# Patient Record
Sex: MALE | Race: BLACK OR AFRICAN AMERICAN | Employment: UNEMPLOYED | URBAN - METROPOLITAN AREA
[De-identification: names, ages, dates, MRNs, and addresses within clinical notes are randomized per-mention and may not be internally consistent; named-entity substitution may affect disease eponyms.]

---

## 2024-01-15 ENCOUNTER — OFFICE VISIT (OUTPATIENT)
Age: 64
End: 2024-01-15
Payer: MEDICAID

## 2024-01-15 VITALS
HEIGHT: 69 IN | HEART RATE: 72 BPM | BODY MASS INDEX: 35.28 KG/M2 | OXYGEN SATURATION: 100 % | SYSTOLIC BLOOD PRESSURE: 148 MMHG | TEMPERATURE: 97.6 F | RESPIRATION RATE: 16 BRPM | WEIGHT: 238.2 LBS | DIASTOLIC BLOOD PRESSURE: 88 MMHG

## 2024-01-15 DIAGNOSIS — M79.89 LEFT LEG SWELLING: ICD-10-CM

## 2024-01-15 DIAGNOSIS — I10 PRIMARY HYPERTENSION: ICD-10-CM

## 2024-01-15 DIAGNOSIS — R06.09 DYSPNEA ON EXERTION: Primary | ICD-10-CM

## 2024-01-15 PROCEDURE — 3079F DIAST BP 80-89 MM HG: CPT

## 2024-01-15 PROCEDURE — 99214 OFFICE O/P EST MOD 30 MIN: CPT

## 2024-01-15 PROCEDURE — 93000 ELECTROCARDIOGRAM COMPLETE: CPT

## 2024-01-15 PROCEDURE — 3077F SYST BP >= 140 MM HG: CPT

## 2024-01-15 RX ORDER — ALLOPURINOL 100 MG/1
100 TABLET ORAL DAILY
COMMUNITY
Start: 2024-01-10

## 2024-01-15 RX ORDER — VALSARTAN 320 MG/1
320 TABLET ORAL DAILY
Qty: 90 TABLET | Refills: 2 | Status: SHIPPED | OUTPATIENT
Start: 2024-01-15 | End: 2024-01-15 | Stop reason: CLARIF

## 2024-01-15 RX ORDER — SOTALOL HYDROCHLORIDE 80 MG/1
160 TABLET ORAL 2 TIMES DAILY
Qty: 60 TABLET | Refills: 1 | Status: SHIPPED | OUTPATIENT
Start: 2024-01-15

## 2024-01-15 RX ORDER — VALSARTAN 160 MG/1
160 TABLET ORAL DAILY
Qty: 30 TABLET | Refills: 0
Start: 2024-01-15 | End: 2024-01-19 | Stop reason: DRUGHIGH

## 2024-01-15 ASSESSMENT — ENCOUNTER SYMPTOMS
ABDOMINAL PAIN: 0
WHEEZING: 0
SORE THROAT: 0
DIARRHEA: 0
NAUSEA: 0
SHORTNESS OF BREATH: 1
COUGH: 0
VOMITING: 0
RHINORRHEA: 0

## 2024-01-15 NOTE — PROGRESS NOTES
daily (with meals)       No current facility-administered medications for this visit.        Social History     Tobacco Use    Smoking status: Former     Types: Cigarettes    Smokeless tobacco: Never   Vaping Use    Vaping Use: Never used   Substance Use Topics    Alcohol use: Never    Drug use: Never        Family History   Problem Relation Age of Onset    Hypertension Maternal Grandmother         Review of Systems   Constitutional:  Negative for activity change, chills, diaphoresis, fatigue and fever.   HENT:  Negative for congestion, rhinorrhea and sore throat.    Eyes:  Negative for visual disturbance.   Respiratory:  Positive for shortness of breath. Negative for cough and wheezing.    Cardiovascular:  Positive for leg swelling (left leg only). Negative for chest pain.   Gastrointestinal:  Negative for abdominal pain, diarrhea, nausea and vomiting.   Genitourinary:  Negative for difficulty urinating and flank pain.   Musculoskeletal:  Negative for myalgias.   Skin:  Negative for rash and wound.   Neurological:  Negative for dizziness, syncope, weakness, light-headedness and headaches.   Psychiatric/Behavioral:  Negative for agitation and confusion. The patient is not nervous/anxious.        Physical Exam  Constitutional:       Comments: Appears to be sitting upright comfortably. No apparent distress.    HENT:      Head: Normocephalic and atraumatic.      Mouth/Throat:      Mouth: Mucous membranes are moist.   Eyes:      Extraocular Movements: Extraocular movements intact.   Cardiovascular:      Rate and Rhythm: Normal rate and regular rhythm.      Pulses: Normal pulses.      Heart sounds: Normal heart sounds. No murmur heard.     No friction rub. No gallop.   Pulmonary:      Effort: Pulmonary effort is normal. No respiratory distress.      Breath sounds: Normal breath sounds. No stridor. No rhonchi or rales.   Abdominal:      General: Bowel sounds are normal. There is no distension.      Palpations: Abdomen is

## 2024-01-15 NOTE — PATIENT INSTRUCTIONS
Medication Changes:  Start taking sotalol 160mg twice daily for atrial flutter     Other recommendations:  Get blood pressure cuff   Low sodium diet  Weight loss     Patient Education        DASH Diet: Care Instructions  Your Care Instructions     The DASH diet is an eating plan that can help lower your blood pressure. DASH stands for Dietary Approaches to Stop Hypertension. Hypertension is high blood pressure.  The DASH diet focuses on eating foods that are high in calcium, potassium, and magnesium. These nutrients can lower blood pressure. The foods that are highest in these nutrients are fruits, vegetables, low-fat dairy products, nuts, seeds, and legumes. But taking calcium, potassium, and magnesium supplements instead of eating foods that are high in those nutrients does not have the same effect. The DASH diet also includes whole grains, fish, and poultry.  The DASH diet is one of several lifestyle changes your doctor may recommend to lower your high blood pressure. Your doctor may also want you to decrease the amount of sodium in your diet. Lowering sodium while following the DASH diet can lower blood pressure even further than just the DASH diet alone.  Follow-up care is a key part of your treatment and safety. Be sure to make and go to all appointments, and call your doctor if you are having problems. It's also a good idea to know your test results and keep a list of the medicines you take.  How can you care for yourself at home?  Following the DASH diet  Eat 4 to 5 servings of fruit each day. A serving is 1 medium-sized piece of fruit, 1/2 cup raw or canned fruit, 1/4 cup dried fruit, or 4 ounces (1/2 cup) of fruit juice. Choose fruit more often than fruit juice.  Eat 4 to 5 servings of vegetables each day. A serving is 1 cup of lettuce or raw leafy vegetables, 1/2 cup of chopped or cooked vegetables, or 4 ounces (1/2 cup) of vegetable juice. Choose vegetables more often than vegetable juice.  Get 2 to 3

## 2024-01-19 ENCOUNTER — OFFICE VISIT (OUTPATIENT)
Age: 64
End: 2024-01-19

## 2024-01-19 VITALS
TEMPERATURE: 97.7 F | WEIGHT: 236.6 LBS | HEART RATE: 64 BPM | HEIGHT: 69 IN | RESPIRATION RATE: 16 BRPM | SYSTOLIC BLOOD PRESSURE: 156 MMHG | DIASTOLIC BLOOD PRESSURE: 89 MMHG | OXYGEN SATURATION: 100 % | BODY MASS INDEX: 35.04 KG/M2

## 2024-01-19 DIAGNOSIS — R06.02 SHORTNESS OF BREATH: ICD-10-CM

## 2024-01-19 DIAGNOSIS — I48.92 ATRIAL FLUTTER, UNSPECIFIED TYPE (HCC): Primary | ICD-10-CM

## 2024-01-19 DIAGNOSIS — I11.9 HYPERTENSIVE HEART DISEASE WITHOUT CHF: ICD-10-CM

## 2024-01-19 DIAGNOSIS — I82.512 CHRONIC DEEP VEIN THROMBOSIS (DVT) OF FEMORAL VEIN OF LEFT LOWER EXTREMITY (HCC): ICD-10-CM

## 2024-01-19 PROBLEM — E66.811 OBESITY (BMI 30.0-34.9): Status: ACTIVE | Noted: 2024-01-19

## 2024-01-19 PROBLEM — R73.03 PRE-DIABETES: Status: ACTIVE | Noted: 2024-01-19

## 2024-01-19 PROBLEM — I82.502 CHRONIC DEEP VEIN THROMBOSIS (DVT) OF LEFT LOWER EXTREMITY (HCC): Status: ACTIVE | Noted: 2024-01-19

## 2024-01-19 PROBLEM — E66.9 OBESITY (BMI 30.0-34.9): Status: ACTIVE | Noted: 2024-01-19

## 2024-01-19 PROBLEM — G47.33 OBSTRUCTIVE SLEEP APNEA SYNDROME: Status: ACTIVE | Noted: 2019-01-03

## 2024-01-19 RX ORDER — VALSARTAN 320 MG/1
320 TABLET ORAL DAILY
Qty: 90 TABLET | Refills: 2 | Status: SHIPPED | OUTPATIENT
Start: 2024-01-19

## 2024-01-19 ASSESSMENT — ENCOUNTER SYMPTOMS
DIARRHEA: 0
RHINORRHEA: 0
SHORTNESS OF BREATH: 1
VOMITING: 0
SORE THROAT: 0
NAUSEA: 0
COUGH: 0
ABDOMINAL PAIN: 0
CONSTIPATION: 0
WHEEZING: 0

## 2024-01-19 NOTE — PROGRESS NOTES
Monty Jean (:  1960) is a 63 y.o. male, with history significant for  sleep apnea (noncompliant with CPAP), hypertension, cardiomegaly, systolic murmur, hyperlipidemia, paroxysmal A-fib on sotalol and Eliquis  who presents for follow up of aflutter.    Most recent echo 2021 with normal left ventricular function, EF 63%, normal LV filling, mild concentric hypertrophy, normal RV size and function, normal PASP.     He was last seen on 1/15/2024 for shortness of breath with exertion.  EKG was completed and it was noted that he was then atrial flutter at that time.  His sotalol was increased to 160 mg twice daily and he was asked to return today for a repeat EKG.    Monty states that his shortness of breath has improved slightly over the past few days.  However, he reports that he continues to have shortness of breath with minimal exertion.  He can only go up a few more stairs than he could prior.  This only started about a month ago.  He reports that he did not have any shortness of breath at all prior to this.  He denies chest pain, palpitations, abdominal pain, nausea, vomiting, fevers/chills, dizziness or lightheadedness, PND, orthopnea.  He does have occasional lower extremity edema typically on the left.  No known history of coronary artery disease.    Subjective   SUBJECTIVE/OBJECTIVE:  HPI  See above    Past Medical History:   Diagnosis Date    Diabetes mellitus (HCC)     High cholesterol     Hypertension     Sleep apnea         Past Surgical History:   Procedure Laterality Date    COLONOSCOPY N/A 3/17/2023    COLORECTAL CANCER SCREENING, HIGH RISK performed by Kareem Clifford MD at [facility] ENDOSCOPY        No Known Allergies     Current Outpatient Medications   Medication Sig Dispense Refill    valsartan (DIOVAN) 320 MG tablet Take 1 tablet by mouth daily 90 tablet 2    allopurinol (ZYLOPRIM) 100 MG tablet Take 1 tablet by mouth daily      sotalol (BETAPACE) 80 MG tablet Take 2 tablets by mouth 2

## 2024-01-19 NOTE — PATIENT INSTRUCTIONS
chopped walnuts or almonds to cooked vegetables.  Try some vegetarian meals using beans and peas. Add garbanzo or kidney beans to salads. Make burritos and tacos with mashed duran beans or black beans.  Where can you learn more?  Go to https://www.[employer].net/patientEd and enter H967 to learn more about \"DASH Diet: Care Instructions.\"  Current as of: September 20, 2023               Content Version: 13.9  © 7668-1331 [employer].   Care instructions adapted under license by [employer]. If you have questions about a medical condition or this instruction, always ask your healthcare professional. [employer] disclaims any warranty or liability for your use of this information.

## 2024-02-07 ENCOUNTER — OFFICE VISIT (OUTPATIENT)
Age: 64
End: 2024-02-07
Payer: MEDICAID

## 2024-02-07 VITALS
TEMPERATURE: 97.7 F | BODY MASS INDEX: 33.21 KG/M2 | WEIGHT: 232 LBS | DIASTOLIC BLOOD PRESSURE: 84 MMHG | HEART RATE: 76 BPM | HEIGHT: 70 IN | OXYGEN SATURATION: 96 % | RESPIRATION RATE: 16 BRPM | SYSTOLIC BLOOD PRESSURE: 148 MMHG

## 2024-02-07 DIAGNOSIS — I50.32 CHRONIC DIASTOLIC (CONGESTIVE) HEART FAILURE (HCC): ICD-10-CM

## 2024-02-07 DIAGNOSIS — I10 PRIMARY HYPERTENSION: ICD-10-CM

## 2024-02-07 DIAGNOSIS — G47.33 OBSTRUCTIVE SLEEP APNEA: ICD-10-CM

## 2024-02-07 DIAGNOSIS — E78.00 HYPERCHOLESTEREMIA: ICD-10-CM

## 2024-02-07 DIAGNOSIS — I48.19 ATRIAL FIBRILLATION, PERSISTENT (HCC): ICD-10-CM

## 2024-02-07 DIAGNOSIS — R06.02 EXERTIONAL SHORTNESS OF BREATH: Primary | ICD-10-CM

## 2024-02-07 DIAGNOSIS — R01.1 SYSTOLIC MURMUR: ICD-10-CM

## 2024-02-07 PROCEDURE — 3079F DIAST BP 80-89 MM HG: CPT | Performed by: INTERNAL MEDICINE

## 2024-02-07 PROCEDURE — 3077F SYST BP >= 140 MM HG: CPT | Performed by: INTERNAL MEDICINE

## 2024-02-07 PROCEDURE — 99215 OFFICE O/P EST HI 40 MIN: CPT | Performed by: INTERNAL MEDICINE

## 2024-02-07 RX ORDER — AMIODARONE HYDROCHLORIDE 200 MG/1
400 TABLET ORAL 2 TIMES DAILY
Qty: 120 TABLET | Refills: 0 | Status: SHIPPED | OUTPATIENT
Start: 2024-02-07

## 2024-02-07 RX ORDER — METOPROLOL SUCCINATE 50 MG/1
50 TABLET, EXTENDED RELEASE ORAL DAILY
Qty: 30 TABLET | Refills: 5 | Status: SHIPPED | OUTPATIENT
Start: 2024-02-07

## 2024-02-07 RX ORDER — INDOMETHACIN 50 MG/1
CAPSULE ORAL
COMMUNITY
Start: 2024-01-19

## 2024-02-07 RX ORDER — FUROSEMIDE 20 MG/1
20 TABLET ORAL DAILY
Qty: 30 TABLET | Refills: 5 | Status: SHIPPED | OUTPATIENT
Start: 2024-02-07

## 2024-02-07 RX ORDER — SOTALOL HYDROCHLORIDE 120 MG/1
120 TABLET ORAL 2 TIMES DAILY
COMMUNITY
Start: 2024-01-29 | End: 2024-02-07 | Stop reason: ALTCHOICE

## 2024-02-07 ASSESSMENT — LIFESTYLE VARIABLES: HOW MANY STANDARD DRINKS CONTAINING ALCOHOL DO YOU HAVE ON A TYPICAL DAY: PATIENT DOES NOT DRINK

## 2024-02-07 ASSESSMENT — ENCOUNTER SYMPTOMS
ALLERGIC/IMMUNOLOGIC NEGATIVE: 1
EYES NEGATIVE: 1
SHORTNESS OF BREATH: 1
GASTROINTESTINAL NEGATIVE: 1

## 2024-02-07 NOTE — PROGRESS NOTES
1. \"Have you been to the ER, urgent care clinic since your last visit?  Hospitalized since your last visit?\"  Reviewed by Dr. Jeevan Staley    2. \"Have you seen or consulted any other health care providers outside of the [facility] since your last visit?\"   Reviewed by Dr. Jeevan Staley     
Take 1 tablet by mouth 2 times daily      valsartan (DIOVAN) 320 MG tablet Take 1 tablet by mouth daily 90 tablet 2    allopurinol (ZYLOPRIM) 100 MG tablet Take 1 tablet by mouth daily      simvastatin (ZOCOR) 40 MG tablet       apixaban (ELIQUIS) 5 MG TABS tablet Take 1 tablet by mouth 2 times daily      metFORMIN (GLUCOPHAGE) 500 MG tablet Take 1 tablet by mouth 2 times daily (with meals)       No current facility-administered medications for this visit.        Social History     Tobacco Use    Smoking status: Former     Current packs/day: 0.00     Types: Cigarettes     Quit date: 1989     Years since quittin.2    Smokeless tobacco: Never   Vaping Use    Vaping Use: Never used   Substance Use Topics    Alcohol use: Never    Drug use: Never        Family History   Problem Relation Age of Onset    Hypertension Maternal Grandmother         Review of Systems   Constitutional: Negative.    HENT: Negative.     Eyes: Negative.    Respiratory:  Positive for shortness of breath.    Cardiovascular:  Positive for palpitations. Negative for chest pain.   Gastrointestinal: Negative.    Endocrine: Negative.    Genitourinary: Negative.    Allergic/Immunologic: Negative.    Neurological: Negative.    Hematological: Negative.    Psychiatric/Behavioral: Negative.       Physical Exam  Vitals and nursing note reviewed.   Constitutional:       Appearance: Normal appearance.   HENT:      Head: Normocephalic and atraumatic.      Right Ear: External ear normal.      Left Ear: External ear normal.      Nose: Nose normal.      Mouth/Throat:      Mouth: Mucous membranes are dry.      Pharynx: Oropharynx is clear.   Eyes:      Extraocular Movements: Extraocular movements intact.      Conjunctiva/sclera: Conjunctivae normal.      Pupils: Pupils are equal, round, and reactive to light.   Neck:      Thyroid: No thyroid mass.      Vascular: JVD present. No carotid bruit.      Trachea: Trachea normal.   Cardiovascular:      Rate and

## 2024-02-22 DIAGNOSIS — I48.91 ATRIAL FIBRILLATION, UNSPECIFIED TYPE (HCC): Primary | ICD-10-CM

## 2024-03-01 LAB
ANION GAP SERPL CALCULATED.3IONS-SCNC: 11 MMOL/L (ref 3–15)
BASOPHILS # BLD: 1 % (ref 0–2)
BASOPHILS ABSOLUTE: 0.1 K/UL (ref 0–0.2)
BUN BLDV-MCNC: 15 MG/DL (ref 6–22)
CALCIUM SERPL-MCNC: 9.3 MG/DL (ref 8.4–10.5)
CHLORIDE BLD-SCNC: 108 MMOL/L (ref 98–110)
CO2: 27 MMOL/L (ref 20–32)
CREAT SERPL-MCNC: 1.1 MG/DL (ref 0.8–1.6)
EOSINOPHIL # BLD: 3 % (ref 0–6)
EOSINOPHILS ABSOLUTE: 0.2 K/UL (ref 0–0.5)
GLOMERULAR FILTRATION RATE: >60 ML/MIN/1.73 SQ.M.
GLUCOSE: 182 MG/DL (ref 70–99)
HCT VFR BLD CALC: 44.1 % (ref 39.3–51.6)
HEMOGLOBIN: 13.4 G/DL (ref 13.1–17.2)
INR BLD: 1.08 (ref 0.89–1.29)
LYMPHOCYTES # BLD: 25 % (ref 20–45)
LYMPHOCYTES ABSOLUTE: 1.6 K/UL (ref 1–4.8)
MCH RBC QN AUTO: 25 PG (ref 26–34)
MCHC RBC AUTO-ENTMCNC: 30 G/DL (ref 31–36)
MCV RBC AUTO: 83 FL (ref 80–95)
MONOCYTES ABSOLUTE: 0.4 K/UL (ref 0.1–1)
MONOCYTES: 7 % (ref 3–12)
NEUTROPHILS ABSOLUTE: 4.1 K/UL (ref 1.8–7.7)
NEUTROPHILS: 65 % (ref 40–75)
PDW BLD-RTO: 17.2 % (ref 10–15.5)
PLATELET # BLD: 291 K/UL (ref 140–440)
PMV BLD AUTO: 10 FL (ref 9–13)
POTASSIUM SERPL-SCNC: 3.7 MMOL/L (ref 3.5–5.5)
PROTHROMBIN TIME: 11.9 SEC (ref 9–13)
RBC: 5.29 M/UL (ref 3.8–5.8)
SODIUM BLD-SCNC: 146 MMOL/L (ref 133–145)
WBC: 6.3 K/UL (ref 4–11)

## 2024-03-07 ENCOUNTER — TELEPHONE (OUTPATIENT)
Age: 64
End: 2024-03-07

## 2024-03-07 RX ORDER — AMIODARONE HYDROCHLORIDE 200 MG/1
200 TABLET ORAL DAILY
Qty: 90 TABLET | Refills: 3 | Status: SHIPPED | OUTPATIENT
Start: 2024-03-07

## 2024-03-07 NOTE — TELEPHONE ENCOUNTER
Monty Jean is calling he was scheduled for a cardioversion yesterday and his heart went back into rhythm.  He continues to take the meds, however he is out of amiodarone.   He wants a refill.  He said it should go to the [facility] in [facility].  Also ask Dr. Staley if patient still needs the hospital follow up with Mary.        Preferred call back number ; [phone]

## 2024-03-20 ENCOUNTER — OFFICE VISIT (OUTPATIENT)
Age: 64
End: 2024-03-20
Payer: MEDICAID

## 2024-03-20 VITALS
RESPIRATION RATE: 16 BRPM | WEIGHT: 230 LBS | TEMPERATURE: 97.7 F | HEART RATE: 49 BPM | BODY MASS INDEX: 32.93 KG/M2 | SYSTOLIC BLOOD PRESSURE: 161 MMHG | HEIGHT: 70 IN | DIASTOLIC BLOOD PRESSURE: 83 MMHG | OXYGEN SATURATION: 98 %

## 2024-03-20 DIAGNOSIS — I50.33 ACUTE ON CHRONIC DIASTOLIC CHF (CONGESTIVE HEART FAILURE) (HCC): ICD-10-CM

## 2024-03-20 DIAGNOSIS — I48.0 PAROXYSMAL A-FIB (HCC): Primary | ICD-10-CM

## 2024-03-20 DIAGNOSIS — I11.0 HYPERTENSIVE HEART DISEASE WITH CHRONIC DIASTOLIC CONGESTIVE HEART FAILURE (HCC): ICD-10-CM

## 2024-03-20 DIAGNOSIS — I50.32 HYPERTENSIVE HEART DISEASE WITH CHRONIC DIASTOLIC CONGESTIVE HEART FAILURE (HCC): ICD-10-CM

## 2024-03-20 PROCEDURE — 99214 OFFICE O/P EST MOD 30 MIN: CPT

## 2024-03-20 PROCEDURE — 93000 ELECTROCARDIOGRAM COMPLETE: CPT

## 2024-03-20 RX ORDER — METOPROLOL SUCCINATE 25 MG/1
25 TABLET, EXTENDED RELEASE ORAL DAILY
Qty: 90 TABLET | Refills: 3 | Status: SHIPPED | OUTPATIENT
Start: 2024-03-20

## 2024-03-20 ASSESSMENT — ENCOUNTER SYMPTOMS
RHINORRHEA: 0
WHEEZING: 0
SORE THROAT: 0
SHORTNESS OF BREATH: 0
VOMITING: 0
NAUSEA: 0
COUGH: 0
ABDOMINAL PAIN: 0
DIARRHEA: 0

## 2024-03-20 NOTE — PATIENT INSTRUCTIONS
Medication Changes:  Start taking lasix 40mg daily (two 20 mg tablets) for 5 days then return to 20mg daily    Please take your blood pressure twice daily and write them down. Please wait at least 30mins after your medications. Put them in Mychart or call the office with them after doing so for 5 days    Other recommendations:  -Low sodium diet - 2g per day  -Fluid restriction - 1.5L per day    Patient Education        DASH Diet: Care Instructions  Your Care Instructions     The DASH diet is an eating plan that can help lower your blood pressure. DASH stands for Dietary Approaches to Stop Hypertension. Hypertension is high blood pressure.  The DASH diet focuses on eating foods that are high in calcium, potassium, and magnesium. These nutrients can lower blood pressure. The foods that are highest in these nutrients are fruits, vegetables, low-fat dairy products, nuts, seeds, and legumes. But taking calcium, potassium, and magnesium supplements instead of eating foods that are high in those nutrients does not have the same effect. The DASH diet also includes whole grains, fish, and poultry.  The DASH diet is one of several lifestyle changes your doctor may recommend to lower your high blood pressure. Your doctor may also want you to decrease the amount of sodium in your diet. Lowering sodium while following the DASH diet can lower blood pressure even further than just the DASH diet alone.  Follow-up care is a key part of your treatment and safety. Be sure to make and go to all appointments, and call your doctor if you are having problems. It's also a good idea to know your test results and keep a list of the medicines you take.  How can you care for yourself at home?  Following the DASH diet  Eat 4 to 5 servings of fruit each day. A serving is 1 medium-sized piece of fruit, 1/2 cup raw or canned fruit, 1/4 cup dried fruit, or 4 ounces (1/2 cup) of fruit juice. Choose fruit more often than fruit juice.  Eat 4 to 5

## 2024-03-20 NOTE — PROGRESS NOTES
Monty Jean (:  1960) is a 63 y.o. male, with history significant for sleep apnea (noncompliant with CPAP), hypertension, cardiomegaly, systolic murmur, hyperlipidemia, paroxysmal A-fib on eliquis who presents for 2 weeks post cardioversion.    He was last seen in the office on 2024.  It was noted that he was back in A-fib at that time.  His sotalol was stopped and he was started on amiodarone and metoprolol.  He was also started on Lasix given evidence of diastolic dysfunction on recent echo.  Cardioversion was recommended at that time.    He presented for cardioversion on 3/6/2024.  An EKG was completed and it was noted that his rhythm was sinus bradycardia.  Cardioversion was was then canceled.  Amiodarone dosing was changed to 200 mg daily.    Monty states he has been feeling well.  He no longer has any exertional shortness of breath but he does have some orthopnea.  Has occasional lower extremity edema as well.  Denies chest pain, palpitations, abdominal pain, nausea, vomiting, fevers/chills, PND, dizziness or lightheadedness, presyncope or syncope.  He reports he has been eating a lot of fried food and had Popeyes before his appointment today.    Relevant results  Echo 2024    Image quality is fair. Contrast used: Definity and bubble study to rule out shunt.    Multiple abnormal color and doppler flows noted on the right side near the mid and apical IVS, can not rule out multiple muscular VSDs, coronary flow or other abnormalities. Bubble study was negative, however the color flow is Left-Right.    Mitral Valve: Mildly thickened leaflets.    Right Atrium: Right atrium is severely dilated. RA volume index is 64 mL/m2.    Normal left ventricular systolic function. EF by 2D Simpsons Biplane is 57%. Mildly increased wall thickness. Normal wall motion. Indeterminate diastolic function.    Reduced systolic function. TAPSE is 1.5 cm.    Right ventricle is dilated with reduced systolic function.

## 2024-04-08 ENCOUNTER — TELEPHONE (OUTPATIENT)
Age: 64
End: 2024-04-08

## 2024-04-08 NOTE — TELEPHONE ENCOUNTER
Called pt to get bp readings, pt will be getting bp cuff today. Advised pt to start checking twice a day, 30 mins after taking bp meds and I will f/u on Monday 3/15/2024.     TANISHA Riojas

## 2024-04-19 RX ORDER — NIFEDIPINE 30 MG/1
30 TABLET, EXTENDED RELEASE ORAL DAILY
Qty: 90 TABLET | Refills: 3 | Status: SHIPPED | OUTPATIENT
Start: 2024-04-19

## 2024-04-19 NOTE — TELEPHONE ENCOUNTER
Called to discuss blood pressure readings    Monty states he is taking metoprolol XL and valsartan 320mg daily. He is not 100% sure if he is taking 25mg of metoprolol or 50mg. I had decreased his dose to 25mg daily on his last appointment.     Given his poorly controlled blood pressures, I sent a prescription to his pharmacy for nifedipine 30mg daily. I discussed with him that he should take that in addition to his other medications. I also recommended low sodium diet.     He has an appointment with Dr. Staley next week. I asked him to continue to check his blood pressure once he gets the new prescription and bring those readings to his next appointment for review. I clarified with him that he is supposed to be taking the lower dose of metoprolol given his bradycardia.     Mary Hill PA-C  3:39 PM

## 2024-04-19 NOTE — TELEPHONE ENCOUNTER
Spoke with pt wife, pt bp reading for yesterday was 174/107 and 181/111. Took bp meds at 9 AM and checked bp at 12 PM.

## 2024-04-26 ENCOUNTER — OFFICE VISIT (OUTPATIENT)
Age: 64
End: 2024-04-26
Payer: MEDICAID

## 2024-04-26 VITALS
DIASTOLIC BLOOD PRESSURE: 86 MMHG | RESPIRATION RATE: 16 BRPM | TEMPERATURE: 97.7 F | BODY MASS INDEX: 32.64 KG/M2 | WEIGHT: 228 LBS | HEIGHT: 70 IN | SYSTOLIC BLOOD PRESSURE: 139 MMHG | HEART RATE: 86 BPM | OXYGEN SATURATION: 98 %

## 2024-04-26 DIAGNOSIS — E78.00 HYPERCHOLESTEREMIA: ICD-10-CM

## 2024-04-26 DIAGNOSIS — R01.1 SYSTOLIC MURMUR: ICD-10-CM

## 2024-04-26 DIAGNOSIS — I10 PRIMARY HYPERTENSION: ICD-10-CM

## 2024-04-26 DIAGNOSIS — G47.33 OBSTRUCTIVE SLEEP APNEA: ICD-10-CM

## 2024-04-26 DIAGNOSIS — Z79.01 LONG TERM (CURRENT) USE OF ANTICOAGULANTS: ICD-10-CM

## 2024-04-26 DIAGNOSIS — I48.0 PAROXYSMAL ATRIAL FIBRILLATION (HCC): ICD-10-CM

## 2024-04-26 DIAGNOSIS — R06.02 EXERTIONAL SHORTNESS OF BREATH: Primary | ICD-10-CM

## 2024-04-26 DIAGNOSIS — I50.32 CHRONIC DIASTOLIC (CONGESTIVE) HEART FAILURE (HCC): ICD-10-CM

## 2024-04-26 PROCEDURE — 3079F DIAST BP 80-89 MM HG: CPT | Performed by: INTERNAL MEDICINE

## 2024-04-26 PROCEDURE — 3075F SYST BP GE 130 - 139MM HG: CPT | Performed by: INTERNAL MEDICINE

## 2024-04-26 PROCEDURE — 93000 ELECTROCARDIOGRAM COMPLETE: CPT | Performed by: INTERNAL MEDICINE

## 2024-04-26 PROCEDURE — 99214 OFFICE O/P EST MOD 30 MIN: CPT | Performed by: INTERNAL MEDICINE

## 2024-04-26 RX ORDER — SOTALOL HYDROCHLORIDE 80 MG/1
160 TABLET ORAL EVERY 12 HOURS
COMMUNITY
End: 2024-04-26 | Stop reason: ALTCHOICE

## 2024-04-26 RX ORDER — NIFEDIPINE 60 MG/1
60 TABLET, EXTENDED RELEASE ORAL DAILY
Qty: 30 TABLET | Refills: 11 | Status: SHIPPED | OUTPATIENT
Start: 2024-04-26

## 2024-04-26 RX ORDER — AMIODARONE HYDROCHLORIDE 200 MG/1
400 TABLET ORAL 2 TIMES DAILY
Qty: 56 TABLET | Refills: 0 | Status: SHIPPED | OUTPATIENT
Start: 2024-04-26 | End: 2024-05-10

## 2024-04-26 ASSESSMENT — ENCOUNTER SYMPTOMS
ALLERGIC/IMMUNOLOGIC NEGATIVE: 1
GASTROINTESTINAL NEGATIVE: 1
EYES NEGATIVE: 1
SHORTNESS OF BREATH: 1

## 2024-04-26 NOTE — PROGRESS NOTES
1. \"Have you been to the ER, urgent care clinic since your last visit?  Hospitalized since your last visit?\" Reviewed by Dr. Jeevan Staley    2. \"Have you seen or consulted any other health care providers outside of the [facility] since your last visit?\" Reviewed by Dr. Jeevan Staley   
atraumatic.      Right Ear: External ear normal.      Left Ear: External ear normal.      Nose: Nose normal.      Mouth/Throat:      Mouth: Mucous membranes are dry.      Pharynx: Oropharynx is clear.   Eyes:      Extraocular Movements: Extraocular movements intact.      Conjunctiva/sclera: Conjunctivae normal.      Pupils: Pupils are equal, round, and reactive to light.   Neck:      Thyroid: No thyroid mass.      Vascular: No carotid bruit or JVD.      Trachea: Trachea normal.   Cardiovascular:      Rate and Rhythm: Tachycardia present. Rhythm irregularly irregular.      Pulses:           Carotid pulses are 1+ on the right side and 1+ on the left side.       Radial pulses are 1+ on the right side and 1+ on the left side.        Femoral pulses are 1+ on the right side and 1+ on the left side.       Popliteal pulses are 1+ on the right side and 1+ on the left side.        Dorsalis pedis pulses are 1+ on the right side and 1+ on the left side.        Posterior tibial pulses are 1+ on the right side and 1+ on the left side.      Heart sounds: S1 normal and S2 normal. Murmur heard.      Crescendo decrescendo systolic murmur is present with a grade of 2/6.      No gallop. No S4 sounds.   Pulmonary:      Effort: Pulmonary effort is normal.      Breath sounds: Normal breath sounds.   Abdominal:      General: Abdomen is flat. Bowel sounds are normal.      Palpations: Abdomen is soft.   Musculoskeletal:         General: Normal range of motion.      Cervical back: Normal range of motion and neck supple.      Right lower leg: No edema.      Left lower leg: No edema.   Lymphadenopathy:      Cervical: No cervical adenopathy.   Skin:     General: Skin is warm and dry.      Capillary Refill: Capillary refill takes 2 to 3 seconds.   Neurological:      General: No focal deficit present.      Mental Status: He is alert and oriented to person, place, and time.   Psychiatric:         Mood and Affect: Mood normal.

## 2024-05-01 ENCOUNTER — TELEPHONE (OUTPATIENT)
Age: 64
End: 2024-05-01

## 2024-05-01 NOTE — TELEPHONE ENCOUNTER
Patient called and stated that he got a prescription for gout from [facility] and the pharmacy will not fill it  with out the ok from Dr. Staley.     Called the pharmacy and they stated that they were waiting for a call from the prescriber of the medication to call them. They got there clarification and they have filled the patient's prescription.

## 2024-05-24 RX ORDER — AMIODARONE HYDROCHLORIDE 200 MG/1
400 TABLET ORAL DAILY
Qty: 180 TABLET | Refills: 3 | Status: SHIPPED | OUTPATIENT
Start: 2024-05-24

## 2024-05-28 ENCOUNTER — NURSE ONLY (OUTPATIENT)
Age: 64
End: 2024-05-28
Payer: MEDICAID

## 2024-05-28 DIAGNOSIS — I48.0 PAROXYSMAL A-FIB (HCC): Primary | ICD-10-CM

## 2024-05-28 PROCEDURE — 93000 ELECTROCARDIOGRAM COMPLETE: CPT | Performed by: INTERNAL MEDICINE

## 2024-05-28 RX ORDER — COLCHICINE 0.6 MG/1
0.6 TABLET ORAL DAILY
COMMUNITY
Start: 2024-05-23

## 2024-05-28 RX ORDER — ALLOPURINOL 300 MG/1
300 TABLET ORAL DAILY
COMMUNITY
Start: 2024-04-29

## 2024-05-28 NOTE — PROGRESS NOTES
1. \"Have you been to the ER, urgent care clinic since your last visit?  Hospitalized since your last visit?\" Monty Jean states,\" Yes,  [facility] [address] in [place], [place] for gout, I was given medication and now it is fine.\"    2. \"Have you seen or consulted any other health care providers outside of the [facility] since your last visit?\" Monty Jean states, \" I went to my regular check up with my pcp, Dr. Forest Staley.\"

## 2024-06-03 NOTE — PROGRESS NOTES
Monty Jean was seen in our office today for a follow-up EKG after medication changes were made. EkG was performed the patient is still in Atrial Fibrillation and has not been on his medication long enough, nor has he taken his medication correctly. Per Dr. Jeevan Staley he was suppose to take amiodarone 400 mg twice a day for 5 days, then resume regular dose of 200 mg twice a day.    Listed below are the patients current meds:      Current Outpatient Medications:     allopurinol (ZYLOPRIM) 300 MG tablet, Take 1 tablet by mouth daily, Disp: , Rfl:     colchicine (COLCRYS) 0.6 MG tablet, Take 1 tablet by mouth daily, Disp: , Rfl:     amiodarone (CORDARONE) 200 MG tablet, Take 2 tablets by mouth daily, Disp: 180 tablet, Rfl: 3    NIFEdipine (PROCARDIA XL) 60 MG extended release tablet, Take 1 tablet by mouth daily, Disp: 30 tablet, Rfl: 11    metoprolol succinate (TOPROL XL) 25 MG extended release tablet, Take 1 tablet by mouth daily Stop metoprolol 50mg daily, Disp: 90 tablet, Rfl: 3    furosemide (LASIX) 20 MG tablet, Take 1 tablet by mouth daily, Disp: 30 tablet, Rfl: 5    simvastatin (ZOCOR) 40 MG tablet, , Disp: , Rfl:     apixaban (ELIQUIS) 5 MG TABS tablet, Take 1 tablet by mouth 2 times daily, Disp: , Rfl:     metFORMIN (GLUCOPHAGE) 500 MG tablet, Take 1 tablet by mouth 2 times daily (with meals), Disp: , Rfl:       No current facility-administered medications for this visit.    There were no vitals filed for this visit.     Plan:     Patient will restart the original plan Dr. Jeevan Staley placed him on and come back on 6/13/2024 for a follow-up EKG.

## 2024-06-13 ENCOUNTER — NURSE ONLY (OUTPATIENT)
Age: 64
End: 2024-06-13
Payer: MEDICAID

## 2024-06-13 VITALS
HEART RATE: 68 BPM | OXYGEN SATURATION: 98 % | RESPIRATION RATE: 16 BRPM | HEIGHT: 70 IN | BODY MASS INDEX: 32.71 KG/M2 | DIASTOLIC BLOOD PRESSURE: 64 MMHG | SYSTOLIC BLOOD PRESSURE: 128 MMHG | TEMPERATURE: 98.1 F

## 2024-06-13 DIAGNOSIS — I48.0 PAROXYSMAL A-FIB (HCC): Primary | ICD-10-CM

## 2024-06-13 PROCEDURE — 93000 ELECTROCARDIOGRAM COMPLETE: CPT | Performed by: INTERNAL MEDICINE

## 2024-06-13 NOTE — PROGRESS NOTES
Monty Jean was seen in our office today for a 2-week follow-up EKG after adhering to the medication changes were made by Dr. Staley on 4/26/202. Mr. Jean has tolerated the amiodarone well and his shortness of breath has improved as well, he can walk, go up and down stairs without being short of breath. EKG was performed and Mr. Jean is back in sinus rhythm.     Listed below are the patients current meds:      Current Outpatient Medications:     allopurinol (ZYLOPRIM) 300 MG tablet, Take 1 tablet by mouth daily, Disp: , Rfl:     colchicine (COLCRYS) 0.6 MG tablet, Take 1 tablet by mouth daily, Disp: , Rfl:     NIFEdipine (PROCARDIA XL) 60 MG extended release tablet, Take 1 tablet by mouth daily, Disp: 30 tablet, Rfl: 11    metoprolol succinate (TOPROL XL) 25 MG extended release tablet, Take 1 tablet by mouth daily Stop metoprolol 50mg daily, Disp: 90 tablet, Rfl: 3    furosemide (LASIX) 20 MG tablet, Take 1 tablet by mouth daily, Disp: 30 tablet, Rfl: 5    simvastatin (ZOCOR) 40 MG tablet, , Disp: , Rfl:     apixaban (ELIQUIS) 5 MG TABS tablet, Take 1 tablet by mouth 2 times daily, Disp: , Rfl:     metFORMIN (GLUCOPHAGE) 500 MG tablet, Take 1 tablet by mouth 2 times daily (with meals), Disp: , Rfl:     amiodarone (CORDARONE) 200 MG tablet, Take 2 tablets by mouth daily (Patient taking differently: Take 1 tablet by mouth 2 times daily), Disp: 180 tablet, Rfl: 3      No current facility-administered medications for this visit.    Vitals:    06/13/24 0931   BP: 128/64   Site: Left Upper Arm   Position: Sitting   Cuff Size: Large Adult   Pulse: 68   Resp: 16   Temp: 98.1 °F (36.7 °C)   TempSrc: Temporal   SpO2: 98%   Height: 1.778 m (5' 10\")      Plan:      Dr. Jeevan Staley has reviewed Mr. Jean EKG and based on his sinus rhythm, he will continue the amiodarone 200 mg tablet twice a day. Mr. Jean voiced understanding.

## 2024-06-14 ENCOUNTER — TELEPHONE (OUTPATIENT)
Age: 64
End: 2024-06-14

## 2024-06-25 ENCOUNTER — TELEPHONE (OUTPATIENT)
Age: 64
End: 2024-06-25

## 2024-06-25 NOTE — TELEPHONE ENCOUNTER
Monty Jean is calling to get clarification on his medication.  He is currently taking amiodarone 200 mg and he only has 4 pills left.  Monty Jean stated that Dr. Staley wanted him to start taking amiodarone 100 mg and if that is still the case then a prescription needs to go to the [facility] pharmacy on [address].          Preferred call back number ; [phone]

## 2024-06-27 DIAGNOSIS — I10 PRIMARY HYPERTENSION: Primary | ICD-10-CM

## 2024-06-27 NOTE — TELEPHONE ENCOUNTER
Called patient and he is to take Amiodarone 200 mg tablets --He is to take 2 tablets daily.  Rx is correct in chart and he also has refills for this medications at the pharmacy.

## 2024-06-27 NOTE — TELEPHONE ENCOUNTER
PCP: Forest Staley II, MD    Last appt:  4/26/2024   Future Appointments   Date Time Provider Department Center   10/25/2024 11:30 AM Jeevan Staley Sr., MD [facility]  AMB       Requested Prescriptions     Pending Prescriptions Disp Refills    ELIQUIS 5 MG TABS tablet [Pharmacy Med Name: Eliquis 5 MG Oral Tablet] 180 tablet 0     Sig: Take 1 tablet by mouth twice daily       Request for a 30 or 90 day supply? Provider Discretion    Pharmacy: confirm    Other Comments: n/a

## 2024-07-02 RX ORDER — APIXABAN 5 MG/1
5 TABLET, FILM COATED ORAL 2 TIMES DAILY
Qty: 180 TABLET | Refills: 3 | Status: SHIPPED | OUTPATIENT
Start: 2024-07-02

## 2024-07-31 RX ORDER — FUROSEMIDE 20 MG
20 TABLET ORAL DAILY
Qty: 30 TABLET | Refills: 0 | OUTPATIENT
Start: 2024-07-31

## 2024-07-31 RX ORDER — AMOXICILLIN 500 MG/1
TABLET, FILM COATED ORAL
COMMUNITY
Start: 2024-06-07

## 2024-07-31 RX ORDER — HYDROCODONE BITARTRATE AND ACETAMINOPHEN 5; 325 MG/1; MG/1
TABLET ORAL
COMMUNITY
Start: 2024-04-29

## 2024-07-31 RX ORDER — FUROSEMIDE 20 MG/1
20 TABLET ORAL DAILY
Qty: 30 TABLET | Refills: 5 | Status: SHIPPED | OUTPATIENT
Start: 2024-07-31

## 2024-07-31 NOTE — TELEPHONE ENCOUNTER
PCP: Forest Staley II, MD    Last appt:  4/26/2024   Future Appointments   Date Time Provider Department Center   10/25/2024 11:30 AM Jeevan Staley Sr., MD [facility] AM[facility]       Requested Prescriptions     Pending Prescriptions Disp Refills    furosemide (LASIX) 20 MG tablet 30 tablet 5     Sig: Take 1 tablet by mouth daily       Request for a 30 or 90 day supply? Provider Discretion    Pharmacy: confirm    Other Comments: n/a

## 2024-10-25 ENCOUNTER — OFFICE VISIT (OUTPATIENT)
Age: 64
End: 2024-10-25
Payer: MEDICAID

## 2024-10-25 VITALS
TEMPERATURE: 97.8 F | WEIGHT: 234 LBS | BODY MASS INDEX: 33.5 KG/M2 | HEIGHT: 70 IN | DIASTOLIC BLOOD PRESSURE: 84 MMHG | HEART RATE: 61 BPM | SYSTOLIC BLOOD PRESSURE: 153 MMHG | OXYGEN SATURATION: 98 %

## 2024-10-25 DIAGNOSIS — I10 PRIMARY HYPERTENSION: ICD-10-CM

## 2024-10-25 DIAGNOSIS — I48.0 PAROXYSMAL ATRIAL FIBRILLATION (HCC): Primary | ICD-10-CM

## 2024-10-25 DIAGNOSIS — Z79.01 LONG TERM (CURRENT) USE OF ANTICOAGULANTS: ICD-10-CM

## 2024-10-25 DIAGNOSIS — R06.02 EXERTIONAL SHORTNESS OF BREATH: ICD-10-CM

## 2024-10-25 DIAGNOSIS — R01.1 SYSTOLIC MURMUR: ICD-10-CM

## 2024-10-25 DIAGNOSIS — G47.33 OBSTRUCTIVE SLEEP APNEA: ICD-10-CM

## 2024-10-25 DIAGNOSIS — E78.00 HYPERCHOLESTEREMIA: ICD-10-CM

## 2024-10-25 DIAGNOSIS — I50.32 CHRONIC DIASTOLIC (CONGESTIVE) HEART FAILURE (HCC): ICD-10-CM

## 2024-10-25 PROCEDURE — 3079F DIAST BP 80-89 MM HG: CPT | Performed by: INTERNAL MEDICINE

## 2024-10-25 PROCEDURE — 3077F SYST BP >= 140 MM HG: CPT | Performed by: INTERNAL MEDICINE

## 2024-10-25 PROCEDURE — 99214 OFFICE O/P EST MOD 30 MIN: CPT | Performed by: INTERNAL MEDICINE

## 2024-10-25 PROCEDURE — 93000 ELECTROCARDIOGRAM COMPLETE: CPT | Performed by: INTERNAL MEDICINE

## 2024-10-25 RX ORDER — KETOROLAC TROMETHAMINE 5 MG/ML
SOLUTION OPHTHALMIC
COMMUNITY
Start: 2024-08-22

## 2024-10-25 RX ORDER — INDOMETHACIN 50 MG/1
CAPSULE ORAL
COMMUNITY
Start: 2024-08-08

## 2024-10-25 RX ORDER — KETOROLAC TROMETHAMINE 4 MG/ML
SOLUTION/ DROPS OPHTHALMIC
COMMUNITY
Start: 2024-08-23

## 2024-10-25 ASSESSMENT — PATIENT HEALTH QUESTIONNAIRE - PHQ9
2. FEELING DOWN, DEPRESSED OR HOPELESS: NOT AT ALL
SUM OF ALL RESPONSES TO PHQ QUESTIONS 1-9: 0
SUM OF ALL RESPONSES TO PHQ9 QUESTIONS 1 & 2: 0
1. LITTLE INTEREST OR PLEASURE IN DOING THINGS: NOT AT ALL
SUM OF ALL RESPONSES TO PHQ QUESTIONS 1-9: 0

## 2024-10-25 ASSESSMENT — ENCOUNTER SYMPTOMS
SHORTNESS OF BREATH: 0
EYES NEGATIVE: 1
GASTROINTESTINAL NEGATIVE: 1
ALLERGIC/IMMUNOLOGIC NEGATIVE: 1

## 2024-10-25 NOTE — PROGRESS NOTES
1. \"Have you been to the ER, urgent care clinic since your last visit?  Hospitalized since your last visit?\" Reviewed by Dr. Jeevan Staley    2. \"Have you seen or consulted any other health care providers outside of the [facility] since your last visit?\" Reviewed by Dr. Jeevan Staley  
10/25/2024 I have spent 36 minutes reviewing previous notes, test results and face to face with the patient discussing the diagnosis and importance of compliance with the treatment plan as well as documenting on the day of the visit.    The patient (or guardian, if applicable) and other individuals in attendance with the patient were advised that Artificial Intelligence will be utilized during this visit to record, process the conversation to generate a clinical note and to support improvement of the AI technology. The patient (or guardian, if applicable) and other individuals in attendance at the appointment consented to the use of AI, including the recording.       An electronic signature was used to authenticate this note.    --Jeevan Staley MD

## 2025-01-17 RX ORDER — FUROSEMIDE 20 MG/1
20 TABLET ORAL DAILY
Qty: 30 TABLET | Refills: 0 | Status: SHIPPED | OUTPATIENT
Start: 2025-01-17

## 2025-01-28 NOTE — TELEPHONE ENCOUNTER
PCP: Forest Staley II, MD    Last appt:  10/25/2024   Future Appointments   Date Time Provider Department Center   5/13/2025  9:45 AM Jeevan Staley Sr., MD [facility]       Requested Prescriptions     Pending Prescriptions Disp Refills    amiodarone (CORDARONE) 200 MG tablet 180 tablet 3     Sig: Take 2 tablets by mouth daily       Request for a 30 or 90 day supply? Provider Discretion    Pharmacy: Confirmed     Other Comments: N/A

## 2025-01-29 RX ORDER — AMIODARONE HYDROCHLORIDE 200 MG/1
400 TABLET ORAL DAILY
Qty: 180 TABLET | Refills: 3 | Status: SHIPPED | OUTPATIENT
Start: 2025-01-29

## 2025-01-31 ENCOUNTER — OFFICE VISIT (OUTPATIENT)
Age: 65
End: 2025-01-31

## 2025-01-31 VITALS
SYSTOLIC BLOOD PRESSURE: 186 MMHG | BODY MASS INDEX: 34.53 KG/M2 | HEART RATE: 62 BPM | TEMPERATURE: 97.2 F | HEIGHT: 70 IN | DIASTOLIC BLOOD PRESSURE: 94 MMHG | OXYGEN SATURATION: 93 % | WEIGHT: 241.2 LBS

## 2025-01-31 DIAGNOSIS — R00.2 PALPITATIONS: Primary | ICD-10-CM

## 2025-01-31 DIAGNOSIS — I50.32 CHRONIC DIASTOLIC CHF (CONGESTIVE HEART FAILURE) (HCC): ICD-10-CM

## 2025-01-31 DIAGNOSIS — I50.32 HYPERTENSIVE HEART DISEASE WITH CHRONIC DIASTOLIC CONGESTIVE HEART FAILURE (HCC): ICD-10-CM

## 2025-01-31 DIAGNOSIS — I11.0 HYPERTENSIVE HEART DISEASE WITH CHRONIC DIASTOLIC CONGESTIVE HEART FAILURE (HCC): ICD-10-CM

## 2025-01-31 DIAGNOSIS — I48.0 PAROXYSMAL A-FIB (HCC): ICD-10-CM

## 2025-01-31 RX ORDER — LATANOPROST 50 UG/ML
SOLUTION/ DROPS OPHTHALMIC
COMMUNITY
Start: 2025-01-21

## 2025-01-31 RX ORDER — COLCHICINE 0.6 MG/1
0.6 TABLET ORAL DAILY PRN
Qty: 30 TABLET | Refills: 0 | Status: SHIPPED | OUTPATIENT
Start: 2025-01-31

## 2025-01-31 ASSESSMENT — ENCOUNTER SYMPTOMS
SHORTNESS OF BREATH: 0
DIARRHEA: 0
WHEEZING: 0
RHINORRHEA: 0
VOMITING: 0
SORE THROAT: 0
ABDOMINAL PAIN: 0
NAUSEA: 0
COUGH: 0

## 2025-01-31 ASSESSMENT — PATIENT HEALTH QUESTIONNAIRE - PHQ9
SUM OF ALL RESPONSES TO PHQ QUESTIONS 1-9: 0
SUM OF ALL RESPONSES TO PHQ9 QUESTIONS 1 & 2: 0
2. FEELING DOWN, DEPRESSED OR HOPELESS: NOT AT ALL
SUM OF ALL RESPONSES TO PHQ QUESTIONS 1-9: 0
1. LITTLE INTEREST OR PLEASURE IN DOING THINGS: NOT AT ALL
SUM OF ALL RESPONSES TO PHQ QUESTIONS 1-9: 0
SUM OF ALL RESPONSES TO PHQ QUESTIONS 1-9: 0

## 2025-01-31 NOTE — PROGRESS NOTES
Monty Jean (:  1960) is a 64 y.o. male, with history significant for sleep apnea (noncompliant with CPAP), hypertension, cardiomegaly, systolic murmur, hyperlipidemia, paroxysmal A-fib on eliquis who presents for palpitations.  History of Present Illness  He reports experiencing some intermittent skipped heartbeats, described as a fluttery sensation without associated pain.  He reports these only lasted a few seconds and have since subsided.  He has not been monitoring his blood pressure at home. He has a home blood pressure monitor available for use.  He reports he took his medications earlier today but does have some confusion with a few of them.    He reports no shortness of breath.  He is compliant with his CPAP therapy.     Relevant results  Treadmill stress test 2024 -     ECG: Resting ECG demonstrates normal sinus rhythm, first-degree AV block and incomplete right bundle branch block.    ECG: The ECG was not diagnostic due to failure to achieve target heart rate.    Stress Test: A Hector protocol stress test was performed. Overall, the patient's exercise capacity was poor for their age. The patient reached stage 1 of the protocol and was stressed for 2 min and 35 sec. The patient reported no symptoms during the stress test.    Echo 2024    Image quality is fair. Contrast used: Definity and bubble study to rule out shunt.    Multiple abnormal color and doppler flows noted on the right side near the mid and apical IVS, can not rule out multiple muscular VSDs, coronary flow or other abnormalities. Bubble study was negative, however the color flow is Left-Right.    Mitral Valve: Mildly thickened leaflets.    Right Atrium: Right atrium is severely dilated. RA volume index is 64 mL/m2.    Normal left ventricular systolic function. EF by 2D Simpsons Biplane is 57%. Mildly increased wall thickness. Normal wall motion. Indeterminate diastolic function.    Reduced systolic function. TAPSE is 1.5

## 2025-01-31 NOTE — PROGRESS NOTES
1. \"Have you been to the ER, urgent care clinic since your last visit?  Hospitalized since your last visit?\" Reviewed by JL Washington    2. \"Have you seen or consulted any other health care providers outside of the [facility] since your last visit?\" Reviewed by  JL Washington

## 2025-02-06 NOTE — TELEPHONE ENCOUNTER
Patient called and left a voice mail on the nurse line requesting a return call about his medications.         I called the patient back and he was identified by full name and .   He said he got confused about his meds after seeing his primary care.  He had his pill bottles and we reviewed meds and dosage of what Cardiology would fill.    Patient does not have the metoprolol and I did a verbal review with LEONORA MATSON, to see if he needed a prescription called in. VS and EKG reviewed from last visit. Verbal order received for the patient to stay off the metoprolol.    I gave the patient an update and he verbalized understanding.    He was instructed to continue to monitor his BP. He said that when his wife gets home, He will have her put the BP readings on MyChart for provider to review. I explained that the provider needed the readings to see if an adjustment would need to be made to his medications. Patient verbalized understanding.

## 2025-02-07 ENCOUNTER — TELEPHONE (OUTPATIENT)
Age: 65
End: 2025-02-07

## 2025-02-07 RX ORDER — TAMSULOSIN HYDROCHLORIDE 0.4 MG/1
0.4 CAPSULE ORAL DAILY
COMMUNITY
Start: 2025-02-04

## 2025-02-07 RX ORDER — NIFEDIPINE 90 MG/1
90 TABLET, FILM COATED, EXTENDED RELEASE ORAL DAILY
COMMUNITY

## 2025-02-07 NOTE — TELEPHONE ENCOUNTER
Can 1 of you please call him on Wednesday and see how his blood pressures are? Also please ask him what medications he is taking and the doses as he seemed unsure today.   MESSAGE FROM CORI MATSON.      Called patient and he was verified by both  and full name.   Patient insistent that someone called him and obtained this information all ready yesterday. Then I asked him if we could go over his meds, using his bottles of medication.  He stated that he brought in his bottles at his visit with LEONORA MATSON.    Patient agreed  to return to his home and I will call him back to get his medication list.

## 2025-02-07 NOTE — TELEPHONE ENCOUNTER
Called patient back and he did provide me with the name of his medication on his bottles.  He recently went to see pcp Dr. Forest Staley and nifedipine was increased, Med list up dated.  Since he just started yesterday I have asked him to check his BP twice a day till Thursday and I will call him to get his readings.     Patient agreed and verbalized understanding.

## 2025-02-17 NOTE — TELEPHONE ENCOUNTER
Called patient, no answer on his cell phone number and unable to leave a message since the patient's mail box was full., I tried to reach the patient on the home phone, and recording came on stating that it was out of service.    Office needs to try and reach out to the patient.

## 2025-02-26 NOTE — TELEPHONE ENCOUNTER
PCP: Forest Staley II, MD    Last appt:  1/31/2025   Future Appointments   Date Time Provider Department Center   5/13/2025  9:45 AM Jeevan Staley Sr., MD [facility]       Requested Prescriptions     Pending Prescriptions Disp Refills    furosemide (LASIX) 20 MG tablet [Pharmacy Med Name: Furosemide 20 MG Oral Tablet] 30 tablet 0     Sig: Take 1 tablet by mouth once daily       Request for a 30 or 90 day supply? Provider Discretion    Pharmacy: Confirmed     Other Comments: N/A

## 2025-02-27 RX ORDER — FUROSEMIDE 20 MG/1
20 TABLET ORAL DAILY
Qty: 90 TABLET | Refills: 3 | Status: SHIPPED | OUTPATIENT
Start: 2025-02-27

## 2025-03-04 NOTE — TELEPHONE ENCOUNTER
Called patient in follow up, He did answer but he was not at home. He was identified by full name and . He started to tell me that he stopped some meds and his bp was high. So I asked him to call me back around 9am from home so we can go over his BP readings and all medications he is taking, and what he stopped. Patient agreed.

## 2025-03-12 NOTE — TELEPHONE ENCOUNTER
Called patient in follow up as he has not called back. He did answer and he was identified by both full name and .  BP Readings:  3/12/25  am     150/82                   11am   148/78  3/11/25            151/80     pm   155/82  3/10/25            150/98             155/78  3/9/25              131/75             146/80  3/8/25              156/80             141/80  3/7/25              156/83             170/88  3/6/25              151/80             141/82    Meds:  Nifedipine 60 mg daily  Lasix 20 mg daily  Metoprolol 25 mg daily  Simvastatin 40 mg nightly  Amiodarone 200 mg 2 tablets daily    Medicaiton list updated. Confirmed meds with patient and instructed him that I will call him back before the end of the week. He verbalized understanding.

## 2025-03-12 NOTE — TELEPHONE ENCOUNTER
Reviewed with Dr. Staley and received new verbal orders:  Increase  Nifedipine 60 mg to twice a day.     I called the patient and reviewed this with him, he then proceeds to tell me that he is already doing this. I told him that he said he was taking it once a day. He insist that he is taking it twice a day. Asked what is the dosage  ____ mg. He could not tell me, but then says that his PCP changed it to 90 mg.   I personally do not think that he has no idea. I asked him if he would come in for a nurse visit to check his BP and also check his home monitor to see if it is working correctly, I also asked him to bring all of his medication bottles.     His last BP readings that he gave me today, does not reflect that he is taking Nifedipine 60 mg twice a day.     I printed out a list of all his Nifedipine Prescriptions to see what he has been getting. I have attached this to the patients chart under Patient Admin.   So for now I told the patient to keep taking what he has been doing.

## 2025-03-14 ENCOUNTER — NURSE ONLY (OUTPATIENT)
Age: 65
End: 2025-03-14

## 2025-03-14 VITALS — DIASTOLIC BLOOD PRESSURE: 70 MMHG | HEART RATE: 70 BPM | SYSTOLIC BLOOD PRESSURE: 139 MMHG

## 2025-03-14 DIAGNOSIS — Z01.30 BLOOD PRESSURE CHECK: Primary | ICD-10-CM

## 2025-03-14 NOTE — PROGRESS NOTES
Pat stated he did have some tea x 1 cup ( not decaffeinated) this am. Pt denied any coffee. Pt stated he did eat a chicken biscuit this am from [employer]. Pt stated he did take his nifedipine this am.     Monty Jean was seen in our office today for BP evaluation. Listed below are the patients current meds:      Current Outpatient Medications:     furosemide (LASIX) 20 MG tablet, Take 1 tablet by mouth once daily, Disp: 90 tablet, Rfl: 3    NIFEdipine (ADALAT CC) 90 MG extended release tablet, Take 1 tablet by mouth daily, Disp: , Rfl:     amiodarone (CORDARONE) 200 MG tablet, Take 2 tablets by mouth daily, Disp: 180 tablet, Rfl: 3    ELIQUIS 5 MG TABS tablet, Take 1 tablet by mouth twice daily, Disp: 180 tablet, Rfl: 3    simvastatin (ZOCOR) 40 MG tablet, , Disp: , Rfl:     tamsulosin (FLOMAX) 0.4 MG capsule, Take 1 capsule by mouth daily (Patient not taking: No sig reported), Disp: , Rfl:     allopurinol (ZYLOPRIM) 300 MG tablet, Take 1 tablet by mouth daily, Disp: , Rfl:     metFORMIN (GLUCOPHAGE) 500 MG tablet, Take 1 tablet by mouth 2 times daily (with meals), Disp: , Rfl:       No current facility-administered medications for this visit.    Vitals:    03/14/25 1023   BP: 133/64   BP Site: Left Upper Arm   Patient Position: Sitting   BP Cuff Size: Large Adult   Pulse: 78        Plan:     Patient to continue current medications. Advised patient that I would forward to Dr. Staley  for review and further instructions. Advised patient that we would call within 24-48 hours with any changes. Patient verbalized understanding.

## 2025-03-20 ENCOUNTER — TELEPHONE (OUTPATIENT)
Age: 65
End: 2025-03-20

## 2025-03-20 NOTE — TELEPHONE ENCOUNTER
Patient called and left a voice message on the phone, stating that he wanted to give BP readings.       I returned call to patient and he was in the car, he said that he had lab work done by PCP yesterday and wanted to know about the results. I explained to him that he has to get the results from his primary care. He wanted to know if his abnormal labs would cause fatigue.    I asked him about the BP readings, and he said that he would call back tomorrow with them. I did see that he came into the [facility] for a BP check and it was very good.  He said that his reading today was 180/110 we talked about how he is taking his BP and of course he is doing it incorrectly.

## 2025-05-01 ENCOUNTER — TELEPHONE (OUTPATIENT)
Age: 65
End: 2025-05-01

## 2025-05-01 RX ORDER — CHLORTHALIDONE 25 MG/1
TABLET ORAL
COMMUNITY
Start: 2025-04-28

## 2025-05-01 NOTE — TELEPHONE ENCOUNTER
Incoming call from Monty Jean, two pt identifiers verified, name and  for Monty Jean.   Patient stated,\" he wanted he talk to Dr. Staley head nurse because he spoke to her last week about his blood pressure running high and it still is along with the swelling. \"I took my blood my pressure again this morning and it was 180/88 and that was about 1 hour after I took my blood pressure medicine, I have not ate or had any caffeine. I have been have headaches, dizziness,and swelling in both of my feet. I stop taking that Nifedipine as of yesterday and now the swelling is gone. I went to my PCP on Monday Dr. Pride and she started my on a new medication called Chlothalidone 25mg Take 1 tablet in the morning.\" I just don't know what to do. The nurse told me to keep log of my blood pressure readings and I have some. I even brought my machine in there and she checked it so I know it is working fine.     180/92 Monday Morning Before Meds    190/82 Monday Evening    178/79 Tuesday Morning Before Meds    180/78 Tuesday about 1.5 hours after medication    180/78 Tuesday Evening     Informed patient I would message to provider.                                      Monty Jean voiced understanding. Encouraged the pt to sign up for the patient portal.

## 2025-05-02 ENCOUNTER — TELEPHONE (OUTPATIENT)
Age: 65
End: 2025-05-02

## 2025-05-02 NOTE — TELEPHONE ENCOUNTER
Incoming call from Monty Jean, two pt identifiers verified, name and  for Monty Jean. Patient stated , I am calling back because I want to talk to the PA because no one has called me back about my blood pressure being high and the medication. I am going out of town this weekend and will not be back til around  and I need to know what to do. 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 My swelling has went down but my blood pressure is still high. I confirmed with patient if he was taking the Nifedipine or whether he stop completely and if his PCP told him to stop or did tell him to continue and added on Chlothalidone 25mg as well. Patient was unsure. Per PA Mary Hill she advised me to tell the patient to start back taking Nifedipine 90 mg 1 tablet Daily. Monty Jean voiced understanding.

## 2025-05-23 DIAGNOSIS — I10 PRIMARY HYPERTENSION: ICD-10-CM

## 2025-05-23 RX ORDER — APIXABAN 5 MG/1
5 TABLET, FILM COATED ORAL 2 TIMES DAILY
Qty: 180 TABLET | Refills: 0 | Status: SHIPPED | OUTPATIENT
Start: 2025-05-23 | End: 2025-05-23 | Stop reason: SDUPTHER

## 2025-05-23 RX ORDER — APIXABAN 5 MG/1
5 TABLET, FILM COATED ORAL 2 TIMES DAILY
Qty: 180 TABLET | Refills: 0 | Status: SHIPPED | OUTPATIENT
Start: 2025-05-23

## 2025-07-14 DIAGNOSIS — R60.0 LOWER EXTREMITY EDEMA: Primary | ICD-10-CM

## 2025-08-17 DIAGNOSIS — I10 PRIMARY HYPERTENSION: ICD-10-CM

## 2025-08-18 ENCOUNTER — OFFICE VISIT (OUTPATIENT)
Age: 65
End: 2025-08-18
Payer: MEDICAID

## 2025-08-18 VITALS
RESPIRATION RATE: 18 BRPM | DIASTOLIC BLOOD PRESSURE: 85 MMHG | SYSTOLIC BLOOD PRESSURE: 184 MMHG | WEIGHT: 238 LBS | HEIGHT: 70 IN | BODY MASS INDEX: 34.07 KG/M2 | HEART RATE: 54 BPM | OXYGEN SATURATION: 98 %

## 2025-08-18 DIAGNOSIS — I82.512 CHRONIC DEEP VEIN THROMBOSIS (DVT) OF FEMORAL VEIN OF LEFT LOWER EXTREMITY (HCC): ICD-10-CM

## 2025-08-18 DIAGNOSIS — I50.32 CHRONIC DIASTOLIC (CONGESTIVE) HEART FAILURE (HCC): ICD-10-CM

## 2025-08-18 DIAGNOSIS — G47.33 OBSTRUCTIVE SLEEP APNEA: ICD-10-CM

## 2025-08-18 DIAGNOSIS — Z79.01 LONG TERM (CURRENT) USE OF ANTICOAGULANTS: ICD-10-CM

## 2025-08-18 DIAGNOSIS — I10 PRIMARY HYPERTENSION: Primary | ICD-10-CM

## 2025-08-18 DIAGNOSIS — R06.02 EXERTIONAL SHORTNESS OF BREATH: ICD-10-CM

## 2025-08-18 DIAGNOSIS — I48.0 PAROXYSMAL ATRIAL FIBRILLATION (HCC): ICD-10-CM

## 2025-08-18 DIAGNOSIS — R00.2 PALPITATIONS: ICD-10-CM

## 2025-08-18 PROCEDURE — 3077F SYST BP >= 140 MM HG: CPT | Performed by: INTERNAL MEDICINE

## 2025-08-18 PROCEDURE — 3079F DIAST BP 80-89 MM HG: CPT | Performed by: INTERNAL MEDICINE

## 2025-08-18 PROCEDURE — 99215 OFFICE O/P EST HI 40 MIN: CPT | Performed by: INTERNAL MEDICINE

## 2025-08-18 RX ORDER — LOSARTAN POTASSIUM 100 MG/1
100 TABLET ORAL DAILY
COMMUNITY
Start: 2025-07-29

## 2025-08-18 RX ORDER — NIFEDIPINE 60 MG/1
60 TABLET, EXTENDED RELEASE ORAL DAILY
Qty: 30 TABLET | Refills: 9 | Status: SHIPPED | OUTPATIENT
Start: 2025-08-18

## 2025-08-18 RX ORDER — APIXABAN 5 MG/1
5 TABLET, FILM COATED ORAL 2 TIMES DAILY
Qty: 180 TABLET | Refills: 3 | Status: SHIPPED | OUTPATIENT
Start: 2025-08-18

## 2025-08-18 RX ORDER — TORSEMIDE 20 MG/1
20 TABLET ORAL DAILY
Qty: 30 TABLET | Refills: 9 | Status: SHIPPED | OUTPATIENT
Start: 2025-08-18

## 2025-08-18 RX ORDER — POTASSIUM CHLORIDE 750 MG/1
10 TABLET, EXTENDED RELEASE ORAL 2 TIMES DAILY WITH MEALS
COMMUNITY
Start: 2025-08-03

## 2025-08-18 ASSESSMENT — ENCOUNTER SYMPTOMS
EYES NEGATIVE: 1
GASTROINTESTINAL NEGATIVE: 1
ALLERGIC/IMMUNOLOGIC NEGATIVE: 1
SHORTNESS OF BREATH: 0